# Patient Record
Sex: MALE | Race: WHITE | NOT HISPANIC OR LATINO | Employment: UNEMPLOYED | URBAN - METROPOLITAN AREA
[De-identification: names, ages, dates, MRNs, and addresses within clinical notes are randomized per-mention and may not be internally consistent; named-entity substitution may affect disease eponyms.]

---

## 2017-05-08 ENCOUNTER — NON-PROVIDER VISIT (OUTPATIENT)
Dept: URGENT CARE | Facility: PHYSICIAN GROUP | Age: 27
End: 2017-05-08

## 2017-05-08 DIAGNOSIS — Z02.1 PRE-EMPLOYMENT DRUG SCREENING: ICD-10-CM

## 2017-05-08 LAB
AMP AMPHETAMINE: NORMAL
COC COCAINE: NORMAL
INT CON NEG: NEGATIVE
INT CON POS: POSITIVE
MET METHAMPHETAMINES: NORMAL
OPI OPIATES: NORMAL
PCP PHENCYCLIDINE: NORMAL
POC DRUG COMMENT 753798-OCCUPATIONAL HEALTH: NEGATIVE
THC: NORMAL

## 2017-05-08 PROCEDURE — 80305 DRUG TEST PRSMV DIR OPT OBS: CPT | Performed by: NURSE PRACTITIONER

## 2017-05-08 NOTE — PATIENT INSTRUCTIONS
Shilo Stoll is a 27 y.o. male here for a non-provider visit for uds    If abnormal was an in office provider notified today (if so, indicate provider)? No  Routed to PCP? No

## 2019-09-06 PROCEDURE — 99285 EMERGENCY DEPT VISIT HI MDM: CPT

## 2019-09-06 PROCEDURE — 305948 HCHG GREEN TRAUMA ACT PRE-NOTIFY NO CC

## 2019-09-07 ENCOUNTER — HOSPITAL ENCOUNTER (EMERGENCY)
Facility: MEDICAL CENTER | Age: 29
End: 2019-09-07
Attending: EMERGENCY MEDICINE
Payer: COMMERCIAL

## 2019-09-07 VITALS
RESPIRATION RATE: 17 BRPM | BODY MASS INDEX: 27.09 KG/M2 | DIASTOLIC BLOOD PRESSURE: 77 MMHG | HEART RATE: 98 BPM | OXYGEN SATURATION: 98 % | HEIGHT: 72 IN | WEIGHT: 200 LBS | SYSTOLIC BLOOD PRESSURE: 127 MMHG | TEMPERATURE: 98.5 F

## 2019-09-07 DIAGNOSIS — S80.812A ABRASION OF LEFT LOWER EXTREMITY, INITIAL ENCOUNTER: ICD-10-CM
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
## 2019-09-07 LAB
ABO GROUP BLD: NORMAL
ALBUMIN SERPL BCP-MCNC: 4.2 G/DL (ref 3.2–4.9)
ALBUMIN/GLOB SERPL: 1.4 G/DL
ALP SERPL-CCNC: 33 U/L (ref 30–99)
ALT SERPL-CCNC: 16 U/L (ref 2–50)
ANION GAP SERPL CALC-SCNC: 10 MMOL/L (ref 0–11.9)
APTT PPP: 28.3 SEC (ref 24.7–36)
AST SERPL-CCNC: 19 U/L (ref 12–45)
BILIRUB SERPL-MCNC: 0.3 MG/DL (ref 0.1–1.5)
BLD GP AB SCN SERPL QL: NORMAL
BUN SERPL-MCNC: 7 MG/DL (ref 8–22)
CALCIUM SERPL-MCNC: 9.1 MG/DL (ref 8.5–10.5)
CHLORIDE SERPL-SCNC: 105 MMOL/L (ref 96–112)
CO2 SERPL-SCNC: 25 MMOL/L (ref 20–33)
CREAT SERPL-MCNC: 0.82 MG/DL (ref 0.5–1.4)
ERYTHROCYTE [DISTWIDTH] IN BLOOD BY AUTOMATED COUNT: 42.7 FL (ref 35.9–50)
ETHANOL BLD-MCNC: 0.01 G/DL
GLOBULIN SER CALC-MCNC: 2.9 G/DL (ref 1.9–3.5)
GLUCOSE SERPL-MCNC: 101 MG/DL (ref 65–99)
HCT VFR BLD AUTO: 40.1 % (ref 42–52)
HGB BLD-MCNC: 12.7 G/DL (ref 14–18)
INR PPP: 0.99 (ref 0.87–1.13)
MCH RBC QN AUTO: 26.7 PG (ref 27–33)
MCHC RBC AUTO-ENTMCNC: 31.7 G/DL (ref 33.7–35.3)
MCV RBC AUTO: 84.4 FL (ref 81.4–97.8)
PLATELET # BLD AUTO: 319 K/UL (ref 164–446)
PMV BLD AUTO: 8.6 FL (ref 9–12.9)
POC BREATHALIZER: 0 PERCENT (ref 0–0.01)
POTASSIUM SERPL-SCNC: 3.7 MMOL/L (ref 3.6–5.5)
PROT SERPL-MCNC: 7.1 G/DL (ref 6–8.2)
PROTHROMBIN TIME: 13.3 SEC (ref 12–14.6)
RBC # BLD AUTO: 4.75 M/UL (ref 4.7–6.1)
RH BLD: NORMAL
SODIUM SERPL-SCNC: 140 MMOL/L (ref 135–145)
WBC # BLD AUTO: 8 K/UL (ref 4.8–10.8)

## 2019-09-07 PROCEDURE — 85610 PROTHROMBIN TIME: CPT

## 2019-09-07 PROCEDURE — 86900 BLOOD TYPING SEROLOGIC ABO: CPT

## 2019-09-07 PROCEDURE — 86901 BLOOD TYPING SEROLOGIC RH(D): CPT

## 2019-09-07 PROCEDURE — 85730 THROMBOPLASTIN TIME PARTIAL: CPT

## 2019-09-07 PROCEDURE — 86850 RBC ANTIBODY SCREEN: CPT

## 2019-09-07 PROCEDURE — 85027 COMPLETE CBC AUTOMATED: CPT

## 2019-09-07 PROCEDURE — 80307 DRUG TEST PRSMV CHEM ANLYZR: CPT

## 2019-09-07 PROCEDURE — 302970 POC BREATHALIZER: Performed by: EMERGENCY MEDICINE

## 2019-09-07 PROCEDURE — 80053 COMPREHEN METABOLIC PANEL: CPT
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
## 2019-09-07 NOTE — ED NOTES
"Assumed care of pt at this time. Pt states he took approx 10 xanax (1mg tablets) over the course of the day. Pt states he did this \"because I wanted to feel tranquil\". Pt denies SI/HI. Pt reports one beer today as well.   "
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
## 2020-04-15 ENCOUNTER — HOSPITAL ENCOUNTER (EMERGENCY)
Dept: HOSPITAL 8 - ED | Age: 30
Discharge: LEFT BEFORE DISCHARGE | End: 2020-04-15
Payer: COMMERCIAL

## 2020-04-15 VITALS — SYSTOLIC BLOOD PRESSURE: 121 MMHG | DIASTOLIC BLOOD PRESSURE: 87 MMHG

## 2020-04-15 VITALS — WEIGHT: 218.7 LBS | BODY MASS INDEX: 30.62 KG/M2 | HEIGHT: 71 IN

## 2020-04-15 DIAGNOSIS — F11.10: ICD-10-CM

## 2020-04-15 DIAGNOSIS — F17.200: ICD-10-CM

## 2020-04-15 DIAGNOSIS — R07.89: Primary | ICD-10-CM

## 2020-04-15 DIAGNOSIS — F15.10: ICD-10-CM

## 2020-04-15 LAB
ALBUMIN SERPL-MCNC: 4.1 G/DL (ref 3.4–5)
ANION GAP SERPL CALC-SCNC: 8 MMOL/L (ref 5–15)
BASOPHILS # BLD AUTO: 0.05 X10^3/UL (ref 0–0.1)
BASOPHILS NFR BLD AUTO: 1 % (ref 0–1)
CALCIUM SERPL-MCNC: 9.6 MG/DL (ref 8.5–10.1)
CHLORIDE SERPL-SCNC: 106 MMOL/L (ref 98–107)
CREAT SERPL-MCNC: 1.02 MG/DL (ref 0.7–1.3)
EOSINOPHIL # BLD AUTO: 0.18 X10^3/UL (ref 0–0.4)
EOSINOPHIL NFR BLD AUTO: 3 % (ref 1–7)
ERYTHROCYTE [DISTWIDTH] IN BLOOD BY AUTOMATED COUNT: 13 % (ref 9.4–14.8)
LYMPHOCYTES # BLD AUTO: 2.19 X10^3/UL (ref 1–3.4)
LYMPHOCYTES NFR BLD AUTO: 33 % (ref 22–44)
MCH RBC QN AUTO: 28.4 PG (ref 27.5–34.5)
MCHC RBC AUTO-ENTMCNC: 34.1 G/DL (ref 33.2–36.2)
MCV RBC AUTO: 83.3 FL (ref 81–97)
MD: NO
MONOCYTES # BLD AUTO: 0.53 X10^3/UL (ref 0.2–0.8)
MONOCYTES NFR BLD AUTO: 8 % (ref 2–9)
NEUTROPHILS # BLD AUTO: 3.73 X10^3/UL (ref 1.8–6.8)
NEUTROPHILS NFR BLD AUTO: 56 % (ref 42–75)
PLATELET # BLD AUTO: 364 X10^3/UL (ref 130–400)
PMV BLD AUTO: 6.9 FL (ref 7.4–10.4)
RBC # BLD AUTO: 5.41 X10^6/UL (ref 4.38–5.82)
TROPONIN I SERPL-MCNC: < 0.015 NG/ML (ref 0–0.04)

## 2020-04-15 PROCEDURE — 82040 ASSAY OF SERUM ALBUMIN: CPT

## 2020-04-15 PROCEDURE — 99285 EMERGENCY DEPT VISIT HI MDM: CPT

## 2020-04-15 PROCEDURE — 80048 BASIC METABOLIC PNL TOTAL CA: CPT

## 2020-04-15 PROCEDURE — 71045 X-RAY EXAM CHEST 1 VIEW: CPT

## 2020-04-15 PROCEDURE — 93005 ELECTROCARDIOGRAM TRACING: CPT

## 2020-04-15 PROCEDURE — 85025 COMPLETE CBC W/AUTO DIFF WBC: CPT

## 2020-04-15 PROCEDURE — 85379 FIBRIN DEGRADATION QUANT: CPT

## 2020-04-15 PROCEDURE — 84484 ASSAY OF TROPONIN QUANT: CPT

## 2020-04-15 PROCEDURE — 36415 COLL VENOUS BLD VENIPUNCTURE: CPT
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
## 2020-04-15 NOTE — NUR
PT ADMITS THAT HE IS MOSTLY CONCERNED FOR A PLACE TO STAY. HE IS HAVING ISSUES 
AT HIS CURRENT PLACE OF RESIDENCE. SOCIAL WORK HAS BEEN NOTIFIED FOR CONSULT.

## 2020-04-15 NOTE — NUR
PATIENT IS INCREASINGLY ANXIOUS IN REGARD TO VISIT HERE. RE-ASSURANCE PROVIDED.

WE WILL MOVE THIS PT TO A ROOM OUTSIDE OF COVID QUARANTINE PER CHARGE RN 
REQUEST.

## 2020-04-15 NOTE — NUR
i am assuming care of this pt from keyla (stanford) at this time. sbar report was 
exchanged at the bedside.

## 2020-04-15 NOTE — NUR
C/O CHEST PAIN X3 DAYS, PT REPORTS HE WAS SEEN AT [facility] 2 DAYS AGO WHEN PAIN 
STARTED. RATES PAIN 10/10. VSS. PLACED CALL LIGHT WITHIN REACH.

## 2020-04-16 ENCOUNTER — HOSPITAL ENCOUNTER (EMERGENCY)
Facility: MEDICAL CENTER | Age: 30
End: 2020-04-16
Attending: EMERGENCY MEDICINE
Payer: COMMERCIAL

## 2020-04-16 ENCOUNTER — APPOINTMENT (OUTPATIENT)
Dept: RADIOLOGY | Facility: MEDICAL CENTER | Age: 30
End: 2020-04-16
Attending: EMERGENCY MEDICINE
Payer: COMMERCIAL

## 2020-04-16 VITALS
TEMPERATURE: 98.8 F | HEART RATE: 83 BPM | OXYGEN SATURATION: 100 % | BODY MASS INDEX: 27.09 KG/M2 | HEIGHT: 72 IN | SYSTOLIC BLOOD PRESSURE: 112 MMHG | RESPIRATION RATE: 22 BRPM | DIASTOLIC BLOOD PRESSURE: 75 MMHG | WEIGHT: 200 LBS

## 2020-04-16 DIAGNOSIS — R07.9 CHEST PAIN, UNSPECIFIED TYPE: ICD-10-CM

## 2020-04-16 DIAGNOSIS — F41.9 ANXIETY: ICD-10-CM

## 2020-04-16 LAB
ALBUMIN SERPL BCP-MCNC: 3.5 G/DL (ref 3.2–4.9)
ALBUMIN/GLOB SERPL: 1.5 G/DL
ALP SERPL-CCNC: 54 U/L (ref 30–99)
ALT SERPL-CCNC: 9 U/L (ref 2–50)
ANION GAP SERPL CALC-SCNC: 14 MMOL/L (ref 7–16)
AST SERPL-CCNC: 15 U/L (ref 12–45)
BASOPHILS # BLD AUTO: 0.8 % (ref 0–1.8)
BASOPHILS # BLD: 0.06 K/UL (ref 0–0.12)
BILIRUB SERPL-MCNC: 0.2 MG/DL (ref 0.1–1.5)
BUN SERPL-MCNC: 12 MG/DL (ref 8–22)
CALCIUM SERPL-MCNC: 7.6 MG/DL (ref 8.5–10.5)
CHLORIDE SERPL-SCNC: 109 MMOL/L (ref 96–112)
CO2 SERPL-SCNC: 18 MMOL/L (ref 20–33)
CREAT SERPL-MCNC: 0.84 MG/DL (ref 0.5–1.4)
EKG IMPRESSION: NORMAL
EKG IMPRESSION: NORMAL
EOSINOPHIL # BLD AUTO: 0.18 K/UL (ref 0–0.51)
EOSINOPHIL NFR BLD: 2.4 % (ref 0–6.9)
ERYTHROCYTE [DISTWIDTH] IN BLOOD BY AUTOMATED COUNT: 39.9 FL (ref 35.9–50)
GLOBULIN SER CALC-MCNC: 2.4 G/DL (ref 1.9–3.5)
GLUCOSE SERPL-MCNC: 87 MG/DL (ref 65–99)
HCT VFR BLD AUTO: 39 % (ref 42–52)
HGB BLD-MCNC: 12.4 G/DL (ref 14–18)
IMM GRANULOCYTES # BLD AUTO: 0.03 K/UL (ref 0–0.11)
IMM GRANULOCYTES NFR BLD AUTO: 0.4 % (ref 0–0.9)
LYMPHOCYTES # BLD AUTO: 1.91 K/UL (ref 1–4.8)
LYMPHOCYTES NFR BLD: 25 % (ref 22–41)
MCH RBC QN AUTO: 28 PG (ref 27–33)
MCHC RBC AUTO-ENTMCNC: 31.8 G/DL (ref 33.7–35.3)
MCV RBC AUTO: 88 FL (ref 81.4–97.8)
MONOCYTES # BLD AUTO: 0.63 K/UL (ref 0–0.85)
MONOCYTES NFR BLD AUTO: 8.2 % (ref 0–13.4)
NEUTROPHILS # BLD AUTO: 4.84 K/UL (ref 1.82–7.42)
NEUTROPHILS NFR BLD: 63.2 % (ref 44–72)
NRBC # BLD AUTO: 0 K/UL
NRBC BLD-RTO: 0 /100 WBC
PLATELET # BLD AUTO: 365 K/UL (ref 164–446)
PMV BLD AUTO: 8.6 FL (ref 9–12.9)
POTASSIUM SERPL-SCNC: 3.8 MMOL/L (ref 3.6–5.5)
PROT SERPL-MCNC: 5.9 G/DL (ref 6–8.2)
RBC # BLD AUTO: 4.43 M/UL (ref 4.7–6.1)
SODIUM SERPL-SCNC: 141 MMOL/L (ref 135–145)
TROPONIN T SERPL-MCNC: 7 NG/L (ref 6–19)
WBC # BLD AUTO: 7.7 K/UL (ref 4.8–10.8)

## 2020-04-16 PROCEDURE — 71045 X-RAY EXAM CHEST 1 VIEW: CPT

## 2020-04-16 PROCEDURE — 80053 COMPREHEN METABOLIC PANEL: CPT

## 2020-04-16 PROCEDURE — 85025 COMPLETE CBC W/AUTO DIFF WBC: CPT

## 2020-04-16 PROCEDURE — 93005 ELECTROCARDIOGRAM TRACING: CPT | Performed by: EMERGENCY MEDICINE

## 2020-04-16 PROCEDURE — 99284 EMERGENCY DEPT VISIT MOD MDM: CPT

## 2020-04-16 PROCEDURE — 84484 ASSAY OF TROPONIN QUANT: CPT

## 2020-04-16 RX ORDER — LORAZEPAM 2 MG/ML
1 INJECTION INTRAMUSCULAR ONCE
Status: DISCONTINUED | OUTPATIENT
Start: 2020-04-16 | End: 2020-04-16 | Stop reason: HOSPADM

## 2020-04-16 ASSESSMENT — FIBROSIS 4 INDEX: FIB4 SCORE: 0.45
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
## 2020-04-16 NOTE — ED TRIAGE NOTES
"Pt states \"I had a heart attack this morning\" while crying and moaning that he is going to die.  EMS relates pt has Hx of anxiety and has been to the ER 4 times this week for the same thing.  Pt c/o CP and N/T to his body.  Pt continues to cry and moan with rapid shallow resps.  "

## 2020-04-16 NOTE — ED PROVIDER NOTES
"ED Provider Note    CHIEF COMPLAINT  \"I am having a heart attack\"    HPI  Shilo Stoll is a 30 y.o. male who presents with chest pain.  Pain started 2 days ago.  States he was seen in [facility].  His pain is gotten worse.  It is been nearly constant but does wax and wane.  Nothing in particular makes it better or worse.  It is nonpleuritic.  Central sternal pressure.  No radiation of the back.  No abdominal pain nausea vomiting diarrhea.  No leg swelling or leg pain.  No travel immobilization or surgeries.  Has history of heroin abuse.  Most recently about a month ago.  Denies methamphetamine cocaine or alcohol.    REVIEW OF SYSTEMS  As per HPI, otherwise a 10 point review of systems is negative    PAST MEDICAL HISTORY  Past Medical History:   Diagnosis Date   • Hand fracture, right 8/2011       SOCIAL HISTORY  Social History     Tobacco Use   • Smoking status: Former Smoker     Types: Cigarettes   • Smokeless tobacco: Former User     Types: Chew   • Tobacco comment: a few cigarettes occasionally   Substance Use Topics   • Alcohol use: No   • Drug use: Yes     Types: Intravenous     Comment: relapsed on heroin       SURGICAL HISTORY  No past surgical history on file.    CURRENT MEDICATIONS  Home Medications    **Home medications have not yet been reviewed for this encounter**         ALLERGIES  Allergies   Allergen Reactions   • Sulfa Drugs Rash     Rash and itchy       PHYSICAL EXAM  VITAL SIGNS: /77   Pulse 86   Temp 37.1 °C (98.8 °F) (Temporal)   Resp (!) 24 Comment: rapid, shallow, crying  Ht 1.829 m (6')   Wt 90.7 kg (200 lb)   SpO2 100%   BMI 27.12 kg/m²    Constitutional: Awake and alert.  Anxious and tearful  HENT:  Atraumatic, Normocephalic.Oropharynx moist mucus membranes, Nose normal inspection.   Eyes: Pupils are dilated bilaterally and equally reactive  Neck: Supple  Cardiovascular: Normal heart rate, Normal rhythm.  Symmetric peripheral pulses.   Thorax & " Lungs: No respiratory distress, No wheezing, No rales, No rhonchi, No chest tenderness.   Abdomen: Bowel sounds normal, soft, non-distended, nontender, no mass  Skin: There is a scab on the left dorsal hand with mild surrounding redness  Back: No tenderness, No CVA tenderness.   Extremities: No clubbing, cyanosis, edema, no Homans or cords   Neurologic: Grossly normal   Psychiatric: Anxious appearing    RADIOLOGY/PROCEDURES  DX-CHEST-PORTABLE (1 VIEW)   Final Result      No acute cardiopulmonary disease.           Imaging is interpreted by radiologist    Labs:  Results for orders placed or performed during the hospital encounter of 04/16/20   CBC WITH DIFFERENTIAL   Result Value Ref Range    WBC 7.7 4.8 - 10.8 K/uL    RBC 4.43 (L) 4.70 - 6.10 M/uL    Hemoglobin 12.4 (L) 14.0 - 18.0 g/dL    Hematocrit 39.0 (L) 42.0 - 52.0 %    MCV 88.0 81.4 - 97.8 fL    MCH 28.0 27.0 - 33.0 pg    MCHC 31.8 (L) 33.7 - 35.3 g/dL    RDW 39.9 35.9 - 50.0 fL    Platelet Count 365 164 - 446 K/uL    MPV 8.6 (L) 9.0 - 12.9 fL    Neutrophils-Polys 63.20 44.00 - 72.00 %    Lymphocytes 25.00 22.00 - 41.00 %    Monocytes 8.20 0.00 - 13.40 %    Eosinophils 2.40 0.00 - 6.90 %    Basophils 0.80 0.00 - 1.80 %    Immature Granulocytes 0.40 0.00 - 0.90 %    Nucleated RBC 0.00 /100 WBC    Neutrophils (Absolute) 4.84 1.82 - 7.42 K/uL    Lymphs (Absolute) 1.91 1.00 - 4.80 K/uL    Monos (Absolute) 0.63 0.00 - 0.85 K/uL    Eos (Absolute) 0.18 0.00 - 0.51 K/uL    Baso (Absolute) 0.06 0.00 - 0.12 K/uL    Immature Granulocytes (abs) 0.03 0.00 - 0.11 K/uL    NRBC (Absolute) 0.00 K/uL   COMP METABOLIC PANEL   Result Value Ref Range    Sodium 141 135 - 145 mmol/L    Potassium 3.8 3.6 - 5.5 mmol/L    Chloride 109 96 - 112 mmol/L    Co2 18 (L) 20 - 33 mmol/L    Anion Gap 14.0 7.0 - 16.0    Glucose 87 65 - 99 mg/dL    Bun 12 8 - 22 mg/dL    Creatinine 0.84 0.50 - 1.40 mg/dL    Calcium 7.6 (L) 8.5 - 10.5 mg/dL    AST(SGOT) 15 12 - 45 U/L    ALT(SGPT) 9 2 - 50 U/L     Alkaline Phosphatase 54 30 - 99 U/L    Total Bilirubin 0.2 0.1 - 1.5 mg/dL    Albumin 3.5 3.2 - 4.9 g/dL    Total Protein 5.9 (L) 6.0 - 8.2 g/dL    Globulin 2.4 1.9 - 3.5 g/dL    A-G Ratio 1.5 g/dL   TROPONIN   Result Value Ref Range    Troponin T 7 6 - 19 ng/L   ESTIMATED GFR   Result Value Ref Range    GFR If African American >60 >60 mL/min/1.73 m 2    GFR If Non African American >60 >60 mL/min/1.73 m 2   EKG (NOW)   Result Value Ref Range    Report       [facility] Emergency Dept.    Test Date:  2020  Pt Name:    ALYSIA FOX         Department: ER  MRN:        [id number]                      Room:        37  Gender:     Male                         Technician: [id number]  :        1990                   Requested By:ER TRIAGE PROTOCOL  Order #:    [id number]                    Reading MD: BRADLEY ZAMBRANO MD    Measurements  Intervals                                Axis  Rate:       174                          P:  RI:                                      QRS:        87  QRSD:       140                          T:          81  QT:         372  QTc:        633    Interpretive Statements  TECHNICALLY POOR TRACING - PLEASE REPEAT ECG!  Sinus rhythm      No previous ECG available for comparison  Electronically Signed On 2020 7:12:29 PDT by BRADLEY ZAMBRANO MD     EKG   Result Value Ref Range    Report       [facility] Emergency Dept.    Test Date:  2020  Pt Name:    ALYSIA FOX         Department: ER  MRN:        [id number]                      Room:       GR 37  Gender:     Male                         Technician: [id number]  :        1990                   Requested By:BRADLEY ZAMBRANO  Order #:    [id number]                    Reading MD: BRADLEY ZAMBRANO MD    Measurements  Intervals                                Axis  Rate:       66                           P:          72  RI:         172                          QRS:        80  QRSD:        94                           T:          80  QT:         392  QTc:        411    Interpretive Statements  SINUS RHYTHM  Compared to ECG 04/16/2020 06:59:57  No significant changes  Electronically Signed On 4- 10:26:36 PDT by BRADLEY ZAMBRANO MD         Medications   LORazepam (ATIVAN) injection 1 mg (has no administration in time range)           COURSE & MEDICAL DECISION MAKING  Patient presents with chest pain.  He is extremely anxious and tearful.  Obtained an EKG that was not adequate.  Repeat EKG was within normal limits.  X-ray was negative.  Laboratory data reassuring.  Vital signs were stable.  No clinical suggestion of PE or dissection.  He has had constant pain for 2 days and troponin is normal ACS is ruled out.  I have discussed reassuring findings with the patient.  Probability at his age would be that this would be esophagitis.  I advised daily antacid.  Stress reduction.  Advised to refrain from illicit substances.  He should return to the ER for worsening, not improving, fevers, difficulty breathing or concern.      FINAL IMPRESSION  1.  chest pain, unspecified  2.  Suspected esophagitis  3.  Acute anxiety reaction      This dictation was created using voice recognition software. The accuracy of the dictation is limited to the abilities of the software.  The nursing notes were reviewed and certain aspects of this information were incorporated into this note.      Electronically signed by: Bradley Zambrano M.D., 4/16/2020 7:10 AM

## 2020-04-16 NOTE — ED NOTES
Pt given d/c instructions, pt refusing to sign paper work, Pt ambulates out of department with steady gait.

## 2020-09-01 ENCOUNTER — TELEPHONE (OUTPATIENT)
Dept: MEDICAL GROUP | Facility: MEDICAL CENTER | Age: 30
End: 2020-09-01

## 2020-09-01 NOTE — TELEPHONE ENCOUNTER
Left message with patient about no show to appointment yesterday 8/31/20.  Explained this was his first no show and the no show policy.